# Patient Record
Sex: FEMALE | Race: WHITE | NOT HISPANIC OR LATINO
[De-identification: names, ages, dates, MRNs, and addresses within clinical notes are randomized per-mention and may not be internally consistent; named-entity substitution may affect disease eponyms.]

---

## 2017-03-28 ENCOUNTER — RECORDS - HEALTHEAST (OUTPATIENT)
Dept: ADMINISTRATIVE | Facility: OTHER | Age: 22
End: 2017-03-28

## 2020-10-09 ENCOUNTER — OFFICE VISIT - HEALTHEAST (OUTPATIENT)
Dept: INTERNAL MEDICINE | Facility: CLINIC | Age: 25
End: 2020-10-09

## 2020-10-09 DIAGNOSIS — Z13.1 ENCOUNTER FOR SCREENING FOR DIABETES MELLITUS: ICD-10-CM

## 2020-10-09 DIAGNOSIS — Z98.890 HISTORY OF SPINAL SURGERY: ICD-10-CM

## 2020-10-09 DIAGNOSIS — R07.9 CHEST PAIN, UNSPECIFIED TYPE: ICD-10-CM

## 2020-10-09 DIAGNOSIS — Z87.898 HISTORY OF PREMATURITY: ICD-10-CM

## 2020-10-09 DIAGNOSIS — Z13.220 ENCOUNTER FOR SCREENING FOR LIPOID DISORDERS: ICD-10-CM

## 2020-10-09 DIAGNOSIS — Z00.00 ENCOUNTER FOR GENERAL ADULT MEDICAL EXAMINATION WITHOUT ABNORMAL FINDINGS: ICD-10-CM

## 2020-10-09 DIAGNOSIS — Z87.09 HISTORY OF BRONCHOPULMONARY DYSPLASIA: ICD-10-CM

## 2020-10-09 LAB
CHOLEST SERPL-MCNC: 178 MG/DL
FASTING STATUS PATIENT QL REPORTED: NO
FASTING STATUS PATIENT QL REPORTED: YES
GLUCOSE BLD-MCNC: 78 MG/DL (ref 70–125)
HDLC SERPL-MCNC: 51 MG/DL
LDLC SERPL CALC-MCNC: 113 MG/DL
TRIGL SERPL-MCNC: 69 MG/DL

## 2020-10-09 ASSESSMENT — MIFFLIN-ST. JEOR: SCORE: 1083.58

## 2020-10-13 ENCOUNTER — COMMUNICATION - HEALTHEAST (OUTPATIENT)
Dept: PEDIATRICS | Facility: CLINIC | Age: 25
End: 2020-10-13

## 2020-10-13 ENCOUNTER — COMMUNICATION - HEALTHEAST (OUTPATIENT)
Dept: INTERNAL MEDICINE | Facility: CLINIC | Age: 25
End: 2020-10-13

## 2020-10-14 ENCOUNTER — AMBULATORY - HEALTHEAST (OUTPATIENT)
Dept: NURSING | Facility: CLINIC | Age: 25
End: 2020-10-14

## 2021-04-09 ENCOUNTER — OFFICE VISIT - HEALTHEAST (OUTPATIENT)
Dept: INTERNAL MEDICINE | Facility: CLINIC | Age: 26
End: 2021-04-09

## 2021-04-09 DIAGNOSIS — Z71.84 TRAVEL ADVICE ENCOUNTER: ICD-10-CM

## 2021-04-09 LAB — HIV 1+2 AB+HIV1 P24 AG SERPL QL IA: NEGATIVE

## 2021-04-09 RX ORDER — ATOVAQUONE AND PROGUANIL HYDROCHLORIDE 250; 100 MG/1; MG/1
TABLET, FILM COATED ORAL
Qty: 90 TABLET | Refills: 0 | Status: SHIPPED | OUTPATIENT
Start: 2021-04-09

## 2021-04-09 ASSESSMENT — MIFFLIN-ST. JEOR: SCORE: 1084.49

## 2021-04-12 ENCOUNTER — COMMUNICATION - HEALTHEAST (OUTPATIENT)
Dept: PEDIATRICS | Facility: CLINIC | Age: 26
End: 2021-04-12

## 2021-06-05 VITALS
DIASTOLIC BLOOD PRESSURE: 70 MMHG | BODY MASS INDEX: 21.14 KG/M2 | HEIGHT: 58 IN | SYSTOLIC BLOOD PRESSURE: 110 MMHG | OXYGEN SATURATION: 99 % | WEIGHT: 100.7 LBS | HEART RATE: 73 BPM

## 2021-06-05 VITALS
WEIGHT: 100.9 LBS | BODY MASS INDEX: 21.18 KG/M2 | DIASTOLIC BLOOD PRESSURE: 72 MMHG | HEIGHT: 58 IN | SYSTOLIC BLOOD PRESSURE: 122 MMHG

## 2021-06-12 NOTE — PROGRESS NOTES
FEMALE PREVENTATIVE EXAM    Assessment and Plan:     1. Encounter for general adult medical examination without abnormal findings  UTD with pap, not sexually active.    2. Encounter for screening for diabetes mellitus  The patient is not fasting, but would like to do the blood test today.  - Glucose    3. Encounter for screening for lipoid disorders  - Lipid Cascade- RANDOM    4. 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 Mcgowan of pulmonology.  She takes Pulmicort during the respiratory season and albuterol as needed.  She has not had any history of any significant respiratory infections or hospitalizations.    5. History of prematurity- ex 27-28 weeker    6. 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  She does not see a spine doctor regularly.    7. Chest pain, unspecified type  Over the last couple months she has been having pain left to her sternum.  It sounds most consistent with musculoskeletal pain.  I discussed with her that we can just continue to monitor for now.    Next follow up:  No follow-ups on file.    Immunization Review  Adult Imm Review: Declines immunizations today  She would like to arrange a nurse only visit.  She is due for a tetanus booster, flu, HPV dose 1, and Pneumovax (due to hx of chronic lung disease). She wants to think more about which vaccines she would like to get.    I discussed the following with the patient:   Adult Healthy Living: Importance of regular exercise  Healthy nutrition  Getting adequate sleep    Ritu Brown MD  Internal Medicine and Pediatrics  [facility]      Subjective:   Chief Complaint: Gloria Dumont is an 25 y.o. female here for a preventative health visit.    Patient has been advised of split billing requirements and indicates understanding: Yes       HPI:      Gloria Dumont is a 25-year-old  "here for a physical and to establish care.    She was previously seen at children's clinic.    She was born premature at 27 to 28 weeks gestation.  She had RDS at birth and received surfactant.  She has a history of bronchopulmonary dysplasia and follows with a pulmonologist Dr. Sanchez once a year at [facility].  She reports that her respiratory status is stable.  She uses Pulmicort once a day during the respiratory season and has an albuterol inhaler if needed.  However she has not had any history of frequent respiratory infections.    She did have necrotizing enterocolitis as a baby.  No surgery was needed.    She did have left hemivertebra at T10, T11, and L5 at birth as well as the fused S1-S2.  She had a resection of the left L5 hemivertebra, anterior spinal fusion from L4 to the sacrum, posterior spinal fusion from L4 to the sacrum, posterior instrumentation from L4 to the sacrum.  This was done when she was a baby.  She has not had any ongoing issues with her spine and does not see a spine doctor regularly.    For the last couple of months every once in a while, \"super rare\" she will have a cramping pain right on the left side of her sternum.  It lasts for just a few seconds and then goes away.  She does not get any radiation of the pain, lightheadedness, palpitations, shortness of breath, nausea with the pain.  It can happen at rest or with exertion.  She has not had any history of any syncopal episodes.  There is no family history of any sudden cardiac death or concerning arrhythmias.    She has periods regularly.  She is not on any birth control.  She has never been sexually active before.  She does not have a partner currently.    She has had hemangiomas on her skin which have been removed by her dermatologist.    Healthy Habits  Are you taking a daily aspirin? No  Do you typically exercising at least 40 min, 3-4 times per week?  Yes  Do you usually eat at least 4 servings of fruit and vegetables a day, " "include whole grains and fiber and avoid regularly eating high fat foods? Yes  Have you had an eye exam in the past two years? NO  Do you see a dentist twice per year? NO  Do you have any concerns regarding sleep? No    Safety Screen  If you own firearms, are they secured in a locked gun cabinet or with trigger locks? The patient does not own any firearms  Do you feel you are safe where you are living?: Yes (10/9/2020  7:47 AM)  Do you feel you are safe in your relationship(s)?: Yes (10/9/2020  7:47 AM)      Review of Systems:  Please see above.  The rest of the review of systems are negative for all systems.     Cancer Screening       Status Date      PAP SMEAR Next Due 3/12/2021      Not specified 3/12/2018             History     Reviewed By Date/Time Sections Reviewed    Vivienne Marie CMA 10/9/2020  7:48 AM Tobacco    Vivienne Marie CMA 10/9/2020  7:47 AM Tobacco, Alcohol, Drug Use            Objective:   Vital Signs:   Visit Vitals  /70 (Patient Site: Right Arm, Patient Position: Sitting, Cuff Size: Adult Regular)   Pulse 73   Ht 4' 9.5\" (1.461 m)   Wt 100 lb 11.2 oz (45.7 kg)   LMP 09/14/2020   SpO2 99%   BMI 21.41 kg/m           PHYSICAL EXAM  /70 (Patient Site: Right Arm, Patient Position: Sitting, Cuff Size: Adult Regular)   Pulse 73   Ht 4' 9.5\" (1.461 m)   Wt 100 lb 11.2 oz (45.7 kg)   LMP 09/14/2020   SpO2 99%   BMI 21.41 kg/m      General Appearance:    Alert, cooperative, no distress, appears stated age   Head:    Normocephalic, without obvious abnormality, atraumatic   Eyes:    PERRL, conjunctiva/corneas clear, EOM's intact   Ears:    Normal TM's and external ear canals, both ears   Nose:   Nares normal, septum midline, mucosa normal   Throat:   Lips, mucosa, and tongue normal; teeth and gums normal   Neck:   Supple, symmetrical, trachea midline, no adenopathy   Back:     Symmetric   Lungs:     Clear to auscultation bilaterally, respirations unlabored   Chest Wall:    No " tenderness or deformity, non tender on palpation    Heart:    Regular rate and rhythm, S1 and S2 normal, no murmur, rub    or gallop   Breast Exam:    No tenderness, masses, or nipple abnormality   Abdomen:     Soft, non-tender, bowel sounds active all four quadrants,     no masses, no organomegaly   Genitalia:    Declined   Extremities:   Extremities normal, atraumatic, no cyanosis or edema   Skin:   Scar on left upper back   Lymph nodes:   Cervical, supraclavicular, and axillary nodes normal   Neurologic:   CNII-XII intact, normal strength, sensation normal grossly         Additional Screenings Completed Today:

## 2021-06-12 NOTE — TELEPHONE ENCOUNTER
Patient coming in tomorrow for Tetanus and pnuemo vaccine. Please sign pended orders for Dr. Ramos.

## 2021-06-16 PROBLEM — Z98.890 HISTORY OF SPINAL SURGERY: Status: ACTIVE | Noted: 2020-10-09

## 2021-06-16 PROBLEM — Z87.09 HISTORY OF BRONCHOPULMONARY DYSPLASIA: Status: ACTIVE | Noted: 2020-10-09

## 2021-06-16 PROBLEM — Z87.898 HISTORY OF PREMATURITY: Status: ACTIVE | Noted: 2020-10-09

## 2021-06-16 NOTE — TELEPHONE ENCOUNTER
04/12/21    Form given to Caroline.    Caroline, please fill out remaining form empty spaces. Attach HIV result.    Let patient know HIV result negative.     Ask how she'd like the form  (through my chart or ).    Dr. Ramos

## 2021-06-16 NOTE — TELEPHONE ENCOUNTER
Left message for pt to call back. Please inform pt of negative HIV result. Form has been completed by Dr. Ramos, would she like to pick it up or have it mailed?

## 2021-06-16 NOTE — TELEPHONE ENCOUNTER
Pt returned call.  Message relayed.    Requesting HIV results and forms are mailed to address of record.

## 2021-06-16 NOTE — TELEPHONE ENCOUNTER
Copy was made of form. One copy was mailed to pt with results and other copy was sent to scanning.

## 2021-06-16 NOTE — PROGRESS NOTES
"    Assessment & Plan     Travel advice encounter  Reviewed that if she is in [facility], no malaria risk however since she will be there for 2 years and may be traveling outside of the city to areas where malaria is endemic, we will give her Malarone pills to have on hand.  I did give her a list of areas in which malaria prophylaxis is recommended in [facility] and she will take it if she goes to those areas.  Typhoid vaccine given her today.  [facility] is in a [place] encephalitis endemic area as well as rabies.  Unfortunately we do not have those vaccines here in clinic.  I did give her a list of travel clinics that she can look into for those vaccines.  HIV testing is required for her program.  We will order that today.  She also has a health form that she would like me to fill out for her program.  I will wait for the HIV test to come back and then fill out for her.  - HIV Antigen/Antibody Diagnostic [place]  - atovaquone-proguaniL (MALARONE) 250-100 mg Tab  Dispense: 90 tablet; Refill: 0       Return for Annual physical.    Ritu Brown MD  [facility] Jeaneth Dumont is 25 y.o. and presents today for the following health issues     HPI     She is planning to go to [facility] in July.  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  She needs a form filled out as well as would like to know if she needs any pretravel vaccines.    Will be mainly in the city of [facility] in a suburb there.    They do require an HIV test.    Review of Systems  See above        Objective    /72 (Patient Site: Right Arm, Patient Position: Sitting, Cuff Size: Adult Regular)   Ht 4' 9.5\" (1.461 m)   Wt 100 lb 14.4 oz (45.8 kg)   LMP 03/15/2021 (Exact Date)   BMI 21.46 kg/m    Body mass index is 21.46 kg/m .  Physical Exam  /72 (Patient Site: Right Arm, Patient Position: Sitting, Cuff Size: Adult Regular)   Ht 4' 9.5\" (1.461 m)   Wt 100 lb 14.4 oz (45.8 kg)   LMP 03/15/2021 " (Exact Date)   BMI 21.46 kg/m      General Appearance:    Alert, cooperative, no distress, appears stated age   Lungs:     Clear to auscultation bilaterally, respirations unlabored    Heart:    Regular rate and rhythm, S1 and S2 normal, no murmur, rub    or gallop   Neurologic:   CNII-XII intact, normal strength, sensation grossly normal

## 2021-06-18 NOTE — PATIENT INSTRUCTIONS - HE
Patient Instructions by Ritu Brown MD at 4/9/2021  3:40 PM     Author: Ritu Brown MD Service: -- Author Type: Physician    Filed: 4/9/2021  3:57 PM Encounter Date: 4/9/2021 Status: Addendum    : Ritu Brown MD (Physician)    Related Notes: Original Note by Ritu Brown MD (Physician) filed at 4/9/2021  3:54 PM       1. If going to an area of malaria (see below), start taking malarone (which is a prophylactic medication. Take 1 tablet (atovaquone 250 mg/proguanil 100 mg per tablet) once daily; start 1 to 2 days prior to entering a malaria-endemic area, continue throughout the stay and for 7 days after returning  Areas with malaria: All areas of [place] [facility] (provinces of [facility], [facility], [facility], [facility], and [facility]), including the town of [facility] and \[facility]\"" in the [facility] region. Rural areas of [facility] ([facility]), [facility] (includes the [place] of [facility]), [facility], and [place]. Low transmission in rural areas of [place], including [facility], [facility], and [facility]. None in cities of [facility] and [facility], resort areas of \[place] [facility] [place] [facility]\"" and [place], and [place] in [place] ([facility]).     2. [facility] is in a [place] Encephalitis and Rabies endemic area. We do not have those vaccines here and so you should look into one of the travel clinics to see if you can get those vaccines there. A list is below.    3. I'll hold onto your form for now and once HIV test comes back, will get that form back to you; I will have our staff contact you for how you'd like to get it back.    The following is a listing of travel clinics in [place]:

## 2021-06-18 NOTE — PATIENT INSTRUCTIONS - HE
Patient Instructions by Ritu Brown MD at 10/9/2020  8:00 AM     Author: Ritu Brown MD Service: -- Author Type: Physician    Filed: 10/9/2020  8:29 AM Encounter Date: 10/9/2020 Status: Addendum    : Ritu Brown MD (Physician)    Related Notes: Original Note by Ritu Brown MD (Physician) filed at 10/9/2020  8:15 AM       Come back for nurse only visit for shots.  The shots you are due for is your tetanus booster, influenza, pneumovax, and HPV.      Preventing Skin Cancer     Use sunscreen of SPF 30 or greater. Apply liberally.   Relaxing in the sun may feel good. But it isnt good for your skin. In fact, the suns harmful rays are the major cause of skin cancer. This is a serious disease that can be life-threatening. People of all ages, races, and backgrounds are at risk.  Skin cancer is the most common cancer in the [place] But in most cases, it can be prevented.  Your role in prevention  You can act today to help prevent skin cancer. Start by avoiding the suns UV (ultraviolet) rays. And dont use tanning beds or lamps. They are no safer than the sun. Taking these steps can help keep you from getting skin cancer. It can also help prevent wrinkles and other aging effects caused by the sun. Make sure your children also follow these safeguards. Now is the time to start taking steps to prevent skin cancer.  When you are outdoors  Protect your skin when you go out during the day. Take safety steps whenever you go out to eat, run errands by car or on foot, or do any outdoor activity. There isnt just one easy way to protect your skin. Its best to follow all of these steps:    Wear tightly woven clothing that covers your skin. Put on a wide-brimmed hat to protect your face, ears, and scalp.    Watch the clock. Try to stay out of the sun between 10 a.m. and 4 p.m. That's when the sun's rays are strongest.    Head for the shade or create your own. Use an  "umbrella when sitting or strolling.    Know that the suns rays can reflect off sand, water, and snow. This can harm your skin. Take extra care when you are near reflective surfaces.    Keep in mind that even when the weather is hazy or cloudy, your skin can be exposed to strong UV rays.    Shield your skin with sunscreen. Also use sunscreen on your childrens skin. Keep babies younger than 6 months old out of the sun.  Tips for using sunscreen  To help prevent skin cancer, choose the right sunscreen and use it correctly. Try these tips:    Choose a sunscreen that has an SPF (sun protection factor) of at least 30. Also choose a sunscreen labeled \"broad spectrum. This will protect you from both UVA and UVB (ultraviolet A and B) rays.    If one brand irritates your skin, try another, such as one without fragrance.    Use a water-resistant sunscreen if you swim or sweat.    Use at least 1 ounce of sunscreen to cover exposed areas. This is enough to fill a shot glass. You might need to adjust the amount depending on your body size.    Put the sunscreen on dry skin about 15 minutes before going outdoors. This gives it time to soak in.    Reapply sunscreen every 2 hours. If youre active, do this more often.    Cover any sun-exposed skin, from your face to your feet. Dont forget your scalp, ears, and lips.    Know that while sunscreen helps protect you, it isnt enough. Sunscreens extend the length of time you can be outdoors before your skin starts to get red. But they don't give you total protection. Using sunscreen doesn't mean you can stay out in the sun for an unlimited time. Your skin cells are still being damaged. You should also wear protective clothing. And try to stay out of the sun as much as you can, especially from 10 a.m. to 4 p.m.  Date Last Reviewed: 7/1/2019 2000-2019 [employer]. [address] [place] [address], [place] [address]. All rights reserved. This information is not intended as a substitute " for professional medical care. Always follow your healthcare professional's instructions.

## 2021-06-20 NOTE — LETTER
Letter by Ritu Brown MD at      Author: Ritu Brown MD Service: -- Author Type: --    Filed:  Encounter Date: 10/13/2020 Status: (Other)       Gloria Dumont  [address] [facility] [address]             October 13, 2020         Dear Gloria Dumont,    Below are the results from Gloria's recent visit:    Resulted Orders   Glucose   Result Value Ref Range    Glucose 78 70 - 125 mg/dL    Patient Fasting > 8hrs? No       Comment:      ate at 0630  ate at 0630    Narrative    Fasting Glucose reference range is 70-99 mg/dL per  American Diabetes Association (ADA) guidelines.   Lipid Cascade- RANDOM   Result Value Ref Range    Cholesterol 178 <=199 mg/dL    Triglycerides 69 <=149 mg/dL    HDL Cholesterol 51 >=50 mg/dL    LDL Calculated 113 <=129 mg/dL    Patient Fasting > 8hrs? Yes       Comment:      ate at 0630        Deshawn Castro,     It was nice meeting you the other day in clinic. Your glucose level and your cholesterol levels looked great.     Please let me know if you any questions or concerns,    Dr. Ramos    Please call with questions or contact us using [employer].    Sincerely,        Electronically signed by Ritu Brown MD